# Patient Record
Sex: MALE
[De-identification: names, ages, dates, MRNs, and addresses within clinical notes are randomized per-mention and may not be internally consistent; named-entity substitution may affect disease eponyms.]

---

## 2018-07-28 ENCOUNTER — TRANSCRIPTION ENCOUNTER (OUTPATIENT)
Age: 68
End: 2018-07-28

## 2023-03-27 ENCOUNTER — NON-APPOINTMENT (OUTPATIENT)
Age: 73
End: 2023-03-27

## 2024-05-14 PROBLEM — Z00.00 ENCOUNTER FOR PREVENTIVE HEALTH EXAMINATION: Status: ACTIVE | Noted: 2024-05-14

## 2024-05-15 ENCOUNTER — NON-APPOINTMENT (OUTPATIENT)
Age: 74
End: 2024-05-15

## 2024-05-15 ENCOUNTER — APPOINTMENT (OUTPATIENT)
Dept: PULMONOLOGY | Facility: CLINIC | Age: 74
End: 2024-05-15
Payer: MEDICARE

## 2024-05-15 VITALS
HEIGHT: 73 IN | OXYGEN SATURATION: 95 % | WEIGHT: 260 LBS | SYSTOLIC BLOOD PRESSURE: 126 MMHG | HEART RATE: 96 BPM | TEMPERATURE: 98.5 F | DIASTOLIC BLOOD PRESSURE: 80 MMHG | BODY MASS INDEX: 34.46 KG/M2

## 2024-05-15 DIAGNOSIS — Z87.891 PERSONAL HISTORY OF NICOTINE DEPENDENCE: ICD-10-CM

## 2024-05-15 PROCEDURE — 94729 DIFFUSING CAPACITY: CPT

## 2024-05-15 PROCEDURE — 94010 BREATHING CAPACITY TEST: CPT

## 2024-05-15 PROCEDURE — 99204 OFFICE O/P NEW MOD 45 MIN: CPT | Mod: 25

## 2024-05-15 PROCEDURE — 94727 GAS DIL/WSHOT DETER LNG VOL: CPT

## 2024-05-15 NOTE — ASSESSMENT
[FreeTextEntry1] : 5/15/2024 Ish Luna is a 73-year-old male who is amazingly not on any regular medication.  The patient has complaints of increased dyspnea wheezing cough with sputum getting stuck on expiration.  This is been ongoing for 2 years but more intense over the last couple months.  Patient has no known prior lung disease he did smoke off and on over the years and the feels he smoked approximately 10 pack years of cigarette smoke.  Patient also has a prior for 20 years and was not symptomatic for 18 years which makes apparent allergy less likely.  Psittacosis is a possibility  but the duration of the illness and lack of fever is less likely the dx can be made by an antibody test and rx by tetracyclines.   at present I will culture his sputum and obtain a Ct scan High resolution  . HIs PFTs were consistent with mild COPD.   time spent 45 min  counseling, education, documentation, imaging reviewed, medication reviewed, inhaler demonstrated, old records reviewed, HX and PE

## 2024-05-15 NOTE — REVIEW OF SYSTEMS
[Cough] : cough [Sputum] : sputum [Negative] : Dermatologic [Recent Wt Gain (___ Lbs)] : ~T no recent weight gain [Recent Wt Loss (___ Lbs)] : ~T no recent weight loss [Nasal Congestion] : no nasal congestion [Postnasal Drip] : no postnasal drip [Sinus Problems] : no sinus problems [Wheezing] : no wheezing [Chest Discomfort] : no chest discomfort [Palpitations] : no palpitations [Seasonal Allergies] : no seasonal allergies [GERD] : no gerd [Diarrhea] : no diarrhea [Constipation] : no constipation [Dysphagia] : no dysphagia [Arthralgias] : no arthralgias [Myalgias] : no myalgias [Back Pain] : no back pain [Anemia] : no anemia [Headache] : no headache [Dizziness] : no dizziness [Numbness] : no numbness [Memory Loss] : no memory loss [Depression] : no depression [Anxiety] : no anxiety [Panic Attacks] : no panic attacks [Diabetes] : no diabetes [Thyroid Problem] : no thyroid problem [TextBox_83] : sees DR Aguirre   [TextBox_119] : slight dizzines that lasts 2 secondss

## 2024-05-15 NOTE — HISTORY OF PRESENT ILLNESS
[Former] : former [Never] : never [TextBox_4] : 5/15/24 The patient is a 74yo He has had a cough after the first Moderna  COVID vaccine  in 2021  He had an occasional cough and sputum but it has gradually got worse with secretions  and cough. He makes note that  he has an African Grey Parrot for 20 yrs  and it sheds and he finds the shredding all over the house despite the fact the bird is isolated    He has stairs in the house and has no dyspnea except in the morning   He finds that Mucinex. He was a  on [facility]     [TextBox_11] : .05 [TextBox_13] : 21 [YearQuit] : 2004

## 2024-05-15 NOTE — REASON FOR VISIT
[Initial] : an initial visit [Cough] : cough [Shortness of Breath] : shortness of breath [TextEntry] : Patient was is self referral. Patient complaints of sob and coughing and bring up phlegm.

## 2024-05-15 NOTE — PHYSICAL EXAM
[No Acute Distress] : no acute distress [Normal Oropharynx] : normal oropharynx [Normal Appearance] : normal appearance [No Neck Mass] : no neck mass [Normal Rate/Rhythm] : normal rate/rhythm [Normal S1, S2] : normal s1, s2 [No Murmurs] : no murmurs [No Resp Distress] : no resp distress [Clear to Auscultation Bilaterally] : clear to auscultation bilaterally [No Abnormalities] : no abnormalities [Benign] : benign [Normal Gait] : normal gait [No Clubbing] : no clubbing [No Cyanosis] : no cyanosis [No Edema] : no edema [FROM] : FROM [Normal Color/ Pigmentation] : normal color/ pigmentation [No Focal Deficits] : no focal deficits [Oriented x3] : oriented x3 [Normal Affect] : normal affect [TextBox_2] : BmI 34    [TextBox_68] : localized wheeze R base [TextBox_80] : infected skin near tth xiphoid process

## 2024-05-29 ENCOUNTER — APPOINTMENT (OUTPATIENT)
Dept: PULMONOLOGY | Facility: CLINIC | Age: 74
End: 2024-05-29
Payer: MEDICARE

## 2024-05-29 VITALS
HEIGHT: 73 IN | DIASTOLIC BLOOD PRESSURE: 80 MMHG | HEART RATE: 88 BPM | WEIGHT: 260 LBS | OXYGEN SATURATION: 96 % | BODY MASS INDEX: 34.46 KG/M2 | SYSTOLIC BLOOD PRESSURE: 132 MMHG | TEMPERATURE: 98.7 F

## 2024-05-29 PROCEDURE — 99214 OFFICE O/P EST MOD 30 MIN: CPT

## 2024-06-02 NOTE — REASON FOR VISIT
[Follow-Up] : a follow-up visit [Cough] : cough [TextEntry] : 2 week. patient states he feels better from two weeks ago. Patient complaints of coughing.

## 2024-06-02 NOTE — ASSESSMENT
[FreeTextEntry1] : 5/15/2024 Ish Luna is a 73-year-old male who is amazingly not on any regular medication.  The patient has complaints of increased dyspnea wheezing cough with sputum getting stuck on expiration.  This is been ongoing for 2 years but more intense over the last couple months.  Patient has no known prior lung disease he did smoke off and on over the years and the feels he smoked approximately 10 pack years of cigarette smoke.  Patient also has a prior for 20 years and was not symptomatic for 18 years which makes apparent allergy less likely.  Psittacosis is a possibility  but the duration of the illness and lack of fever is less likely the dx can be made by an antibody test and rx by tetracyclines.   at present I will culture his sputum and obtain a Ct scan High resolution  . HIs PFTs were consistent with mild COPD.   time spent 45 min  counseling, education, documentation, imaging reviewed, medication reviewed, inhaler demonstrated, old records reviewed, HX and PE  The patient's  CT has mild emphysema and bronchial wall thickening  There is a new 5mm nodule  There is no evidence for psittacosis. I will treat him  for chronic airway inflammation with trelegy 100.. The Ct of the chest  will need to be serial monitored for the 5mm nodule with a Ct scan in one year The plan was discussed with the patient and there is agreement  time spent 30 min  f/u 2 weeks

## 2024-06-04 DIAGNOSIS — J20.9 ACUTE BRONCHITIS, UNSPECIFIED: ICD-10-CM

## 2024-06-13 ENCOUNTER — RX RENEWAL (OUTPATIENT)
Age: 74
End: 2024-06-13

## 2024-06-13 RX ORDER — LEVOFLOXACIN 500 MG/1
500 TABLET, FILM COATED ORAL DAILY
Qty: 7 | Refills: 0 | Status: ACTIVE | COMMUNITY
Start: 2024-06-04 | End: 1900-01-01

## 2024-06-17 ENCOUNTER — APPOINTMENT (OUTPATIENT)
Dept: PULMONOLOGY | Facility: CLINIC | Age: 74
End: 2024-06-17
Payer: MEDICARE

## 2024-06-17 VITALS
BODY MASS INDEX: 34.46 KG/M2 | OXYGEN SATURATION: 96 % | TEMPERATURE: 98 F | WEIGHT: 260 LBS | DIASTOLIC BLOOD PRESSURE: 80 MMHG | HEART RATE: 87 BPM | HEIGHT: 73 IN | SYSTOLIC BLOOD PRESSURE: 124 MMHG

## 2024-06-17 DIAGNOSIS — R05.3 CHRONIC COUGH: ICD-10-CM

## 2024-06-17 DIAGNOSIS — J44.9 CHRONIC OBSTRUCTIVE PULMONARY DISEASE, UNSPECIFIED: ICD-10-CM

## 2024-06-17 PROCEDURE — 94010 BREATHING CAPACITY TEST: CPT

## 2024-06-17 PROCEDURE — 99214 OFFICE O/P EST MOD 30 MIN: CPT | Mod: 25

## 2024-06-17 RX ORDER — FLUTICASONE FUROATE, UMECLIDINIUM BROMIDE AND VILANTEROL TRIFENATATE 100; 62.5; 25 UG/1; UG/1; UG/1
100-62.5-25 POWDER RESPIRATORY (INHALATION)
Qty: 1 | Refills: 6 | Status: ACTIVE | COMMUNITY
Start: 2024-06-17 | End: 1900-01-01

## 2024-06-17 NOTE — REVIEW OF SYSTEMS
[Negative] : Dermatologic [Recent Wt Gain (___ Lbs)] : ~T no recent weight gain [Recent Wt Loss (___ Lbs)] : ~T no recent weight loss [Nasal Congestion] : no nasal congestion [Postnasal Drip] : no postnasal drip [Sinus Problems] : no sinus problems [Cough] : no cough [Sputum] : no sputum [Wheezing] : no wheezing [Chest Discomfort] : no chest discomfort [Palpitations] : no palpitations [Seasonal Allergies] : no seasonal allergies [GERD] : no gerd [Diarrhea] : no diarrhea [Constipation] : no constipation [Dysphagia] : no dysphagia [Arthralgias] : no arthralgias [Myalgias] : no myalgias [Back Pain] : no back pain [Anemia] : no anemia [Headache] : no headache [Dizziness] : no dizziness [Numbness] : no numbness [Memory Loss] : no memory loss [Depression] : no depression [Anxiety] : no anxiety [Panic Attacks] : no panic attacks [Diabetes] : no diabetes [Thyroid Problem] : no thyroid problem [TextBox_83] : sees DR Aguirre   [TextBox_119] : slight dizzines that lasts 2 secondss

## 2024-06-17 NOTE — ASSESSMENT
[FreeTextEntry1] : 6/17/2024 Ish Luna 74-year-old male with persistent cough and mild impairment on his pulmonary function test.  The patient had multiple prior interventions.  Patient was started on antibiotic of Levaquin and Trelegy 100.  Patient states as soon as he took the Trelegy he started to feel better.  He finished the antibiotic.  Given the length of time the patient's cough I will continue the Trelegy for a few more months or Breo if the Trelegy is too expensive.  The patient had a spirometry today.  The FEV1 was 2.31 L 67% the mid lung flow 25-75 was 39% , the patient's initial FEV1 on 5/15 was 2.03 making the improvement of his FEV1 to 2.31 L approximately 15%. The patient is functioning much better. time spent 30 min counseling, education, documentation, , medication reviewed, inhaler demonstrated, old records reviewed, HX and PE   trelegy 100  sample givien to the patient

## 2024-06-17 NOTE — HISTORY OF PRESENT ILLNESS
[Former] : former [Never] : never [TextBox_4] : 6/17/24  1) feels great no longer has a cough  2) He attributes the difference to using trelegy   3) The patient has also been on a course ABX  [TextBox_11] : .05 [TextBox_13] : 21 [YearQuit] : 2004

## 2024-06-17 NOTE — PHYSICAL EXAM
[No Acute Distress] : no acute distress [Normal Oropharynx] : normal oropharynx [Normal Appearance] : normal appearance [No Neck Mass] : no neck mass [Normal Rate/Rhythm] : normal rate/rhythm [Normal S1, S2] : normal s1, s2 [No Murmurs] : no murmurs [No Resp Distress] : no resp distress [Clear to Auscultation Bilaterally] : clear to auscultation bilaterally [No Abnormalities] : no abnormalities [Benign] : benign [Normal Gait] : normal gait [No Clubbing] : no clubbing [No Cyanosis] : no cyanosis [No Edema] : no edema [FROM] : FROM [Normal Color/ Pigmentation] : normal color/ pigmentation [No Focal Deficits] : no focal deficits [Oriented x3] : oriented x3 [Normal Affect] : normal affect [TextBox_2] : BmI 34    [TextBox_68] : Clear [TextBox_80] : infected skin near tth xiphoid process

## 2024-09-02 ENCOUNTER — NON-APPOINTMENT (OUTPATIENT)
Age: 74
End: 2024-09-02

## 2024-09-16 ENCOUNTER — NON-APPOINTMENT (OUTPATIENT)
Age: 74
End: 2024-09-16

## 2024-09-16 ENCOUNTER — APPOINTMENT (OUTPATIENT)
Dept: OPHTHALMOLOGY | Facility: CLINIC | Age: 74
End: 2024-09-16
Payer: MEDICARE

## 2024-09-16 PROCEDURE — 92134 CPTRZ OPH DX IMG PST SGM RTA: CPT

## 2024-09-16 PROCEDURE — 92004 COMPRE OPH EXAM NEW PT 1/>: CPT

## 2024-10-08 ENCOUNTER — APPOINTMENT (OUTPATIENT)
Dept: PULMONOLOGY | Facility: CLINIC | Age: 74
End: 2024-10-08
Payer: MEDICARE

## 2024-10-08 ENCOUNTER — APPOINTMENT (OUTPATIENT)
Dept: PULMONOLOGY | Facility: CLINIC | Age: 74
End: 2024-10-08

## 2024-10-08 VITALS
BODY MASS INDEX: 34.46 KG/M2 | OXYGEN SATURATION: 95 % | HEIGHT: 73 IN | DIASTOLIC BLOOD PRESSURE: 80 MMHG | SYSTOLIC BLOOD PRESSURE: 128 MMHG | WEIGHT: 260 LBS | HEART RATE: 98 BPM | TEMPERATURE: 97.5 F

## 2024-10-08 DIAGNOSIS — J44.9 CHRONIC OBSTRUCTIVE PULMONARY DISEASE, UNSPECIFIED: ICD-10-CM

## 2024-10-08 PROCEDURE — 99214 OFFICE O/P EST MOD 30 MIN: CPT

## 2025-01-21 ENCOUNTER — NON-APPOINTMENT (OUTPATIENT)
Age: 75
End: 2025-01-21

## 2025-02-04 ENCOUNTER — NON-APPOINTMENT (OUTPATIENT)
Age: 75
End: 2025-02-04

## 2025-03-24 ENCOUNTER — APPOINTMENT (OUTPATIENT)
Dept: OPHTHALMOLOGY | Facility: CLINIC | Age: 75
End: 2025-03-24

## 2025-08-26 ENCOUNTER — NON-APPOINTMENT (OUTPATIENT)
Age: 75
End: 2025-08-26

## 2025-08-28 ENCOUNTER — APPOINTMENT (OUTPATIENT)
Dept: PULMONOLOGY | Facility: CLINIC | Age: 75
End: 2025-08-28
Payer: MEDICARE

## 2025-08-28 VITALS
SYSTOLIC BLOOD PRESSURE: 132 MMHG | OXYGEN SATURATION: 96 % | HEIGHT: 73 IN | TEMPERATURE: 97.8 F | DIASTOLIC BLOOD PRESSURE: 82 MMHG | BODY MASS INDEX: 35.12 KG/M2 | HEART RATE: 91 BPM | WEIGHT: 265 LBS

## 2025-08-28 DIAGNOSIS — J44.9 CHRONIC OBSTRUCTIVE PULMONARY DISEASE, UNSPECIFIED: ICD-10-CM

## 2025-08-28 PROCEDURE — 94729 DIFFUSING CAPACITY: CPT

## 2025-08-28 PROCEDURE — 94727 GAS DIL/WSHOT DETER LNG VOL: CPT

## 2025-08-28 PROCEDURE — 94010 BREATHING CAPACITY TEST: CPT

## 2025-08-28 PROCEDURE — ZZZZZ: CPT

## 2025-08-28 PROCEDURE — 99214 OFFICE O/P EST MOD 30 MIN: CPT | Mod: 25

## 2025-08-28 RX ORDER — FINASTERIDE 1 MG/1
1 TABLET ORAL
Refills: 0 | Status: ACTIVE | COMMUNITY

## 2025-09-19 ENCOUNTER — APPOINTMENT (OUTPATIENT)
Dept: PULMONOLOGY | Facility: CLINIC | Age: 75
End: 2025-09-19